# Patient Record
(demographics unavailable — no encounter records)

---

## 2024-11-25 NOTE — ASSESSMENT
[FreeTextEntry1] : reviewed labwork from oct 2024 Extensive discussion on flu vaccine and possible side affects. Pt agreed to get flu vaccine

## 2024-11-25 NOTE — COUNSELING
[Normal Weight - ( BMI  <25 )] : normal weight - ( BMI  <25 ) [Continue diet as tolerated] : continue diet as tolerated based on goals of care [Non - Smoker] : non-smoker [Minimize unnecessary interventions] : minimize unnecessary interventions [Maintain functional ability] : maintain functional ability [Discussed disease trajectory with patient/caregiver] : discussed disease trajectory with patient/caregiver [Likely to achieve goals/desired outcomes] : likely to achieve goals/desired outcomes [Patient/Caregiver has ___ understanding of disease process] : patient/caregiver has [unfilled] understanding of disease process [Advanced Directives discussed: ____] : Advanced directives discussed: [unfilled] [Completed Medical Orders for Life-Sustaining Treatment] : completed medical orders for life-sustaining treatment [DNR] : Code Status: DNR [Limited] : Treatment Guidelines: Limited [Trial of Bipap] : Intubation: Trial of Bipap [[facility] Date: _____] [facility]/last verification date: [unfilled] [_____] : HCP: [unfilled]

## 2024-11-25 NOTE — HISTORY OF PRESENT ILLNESS
[Patient] : patient [FreeTextEntry1] : COPD, CHF.  [FreeTextEntry2] : PMH CHF, HTN, MV replacement with bioprosthetic valve, CAD s/p CABG, aflutter, PAD, COPD on 2L O2 at night and exertion, HLD  Interval events: saw pcp, cardiology, Pulmonology   Vision: good, uses glasses for reading Hearing: hearing difficulties. Will think about following with audiology Gait/Falls/Assisted devices: good, no falls Skin: intact Pain: knees, back - chronic, uses tylenol and icy hot Appetite: alright, no missing teeth. Low sodium diet, no fluid restriction BM: regular Urine: no issues Sleep: always been bad. cant fall asleep and has trouble staying asleep, chronic whole life Mood: good.    Active medical problems: #CHF/HTN/ Mitral valve replacement with bioprosthetic valve / CAD s/p quadruple bypass CABG / Atrial flutter/ PVC / PAD - following w cardiology and HF, entresto 24/26 BID, warfarin (managed by cardiology), eplerenone 25mg daily, rosuvastatin 20mg daily, asa 81mg daily #COPD with emphysema on 2L O2 at night and with exertion/ possible PAWAN - following w pulm, breztri 160/9/4.8 2 puffs BID, albuterol HFA #HLD - statin #Hx HyperK  Social: Lives with Significant other Zayda. no children. Zayda has a daughter and 2 grandsons that patient is very close to as well.  Smoking, alcohol, drugs: Stopped smoking 2019 and smoked since 17yo 1 pack/day. no alcohol, no drugs HCP: primary- Zayda Espana (sig other) [phone], secondary - brother duncan lund  MOLST: DNR/DNI ok with NIMV, hospitalize, use IVF and abx, no FT or dialysis. Limited.   Follows with: PCP - [facility] Heart failure Pulmonology

## 2024-11-25 NOTE — PHYSICAL EXAM
[No Acute Distress] : no acute distress [Normal Sclera/Conjunctiva] : normal sclera/conjunctiva [EOMI] : extra ocular movement intact [Normal Outer Ear/Nose] : the ears and nose were normal in appearance [Normal Oropharynx] : the oropharynx was normal [No Respiratory Distress] : no respiratory distress [No Accessory Muscle Use] : no accessory muscle use [Normal Rate] : heart rate was normal  [Regular Rhythm] : with a regular rhythm [Normal S1, S2] : normal S1 and S2 [No Murmurs] : no murmurs heard [No Edema] : there was no peripheral edema [Normal Bowel Sounds] : normal bowel sounds [Non Tender] : non-tender [Soft] : abdomen soft [Not Distended] : not distended [Normal Post Cervical Nodes] : no posterior cervical lymphadenopathy [Normal Anterior Cervical Nodes] : no anterior cervical lymphadenopathy [No CVA Tenderness] : no ~M costovertebral angle tenderness [No Spinal Tenderness] : no spinal tenderness [Normal Gait] : normal gait [Normal Strength/Tone] : muscle strength and tone were normal [No Rash] : no rash [Oriented x3] : oriented to person, place, and time [Normal Affect] : the affect was normal [Normal Mood] : the mood was normal [Normal Insight/Judgement] : insight and judgment were intact [de-identified] : crackles at L base [Clear to Auscultation] : lungs were clear to auscultation bilaterally [No Motor Deficits] : the motor exam was normal

## 2024-11-25 NOTE — COUNSELING
[Normal Weight - ( BMI  <25 )] : normal weight - ( BMI  <25 ) [Continue diet as tolerated] : continue diet as tolerated based on goals of care [Non - Smoker] : non-smoker [Minimize unnecessary interventions] : minimize unnecessary interventions [Maintain functional ability] : maintain functional ability [Discussed disease trajectory with patient/caregiver] : discussed disease trajectory with patient/caregiver [Likely to achieve goals/desired outcomes] : likely to achieve goals/desired outcomes [Patient/Caregiver has ___ understanding of disease process] : patient/caregiver has [unfilled] understanding of disease process [Advanced Directives discussed: ____] : Advanced directives discussed: [unfilled] [Completed Medical Orders for Life-Sustaining Treatment] : completed medical orders for life-sustaining treatment [DNR] : Code Status: DNR [Limited] : Treatment Guidelines: Limited [Trial of Bipap] : Intubation: Trial of Bipap [[facility] Date: _____] [facility]/last verification date: [unfilled] [_____] : HCP: [unfilled]

## 2024-11-25 NOTE — REASON FOR VISIT
[Follow-Up] : a follow-up visit [Pre-Visit Preparation] : pre-visit preparation was done [FreeTextEntry1] : chf [FreeTextEntry2] : chart review

## 2024-11-25 NOTE — HEALTH RISK ASSESSMENT
[HRA Reviewed] : Health risk assessment reviewed [Independent] : using transportation [Some assistance needed] : managing finances [No falls in past year] : Patient reported no falls in the past year [Yes] : The patient does have visual impairment [TimeGetUpGo] : 18

## 2024-11-25 NOTE — PHYSICAL EXAM
[No Acute Distress] : no acute distress [Normal Sclera/Conjunctiva] : normal sclera/conjunctiva [EOMI] : extra ocular movement intact [Normal Outer Ear/Nose] : the ears and nose were normal in appearance [Normal Oropharynx] : the oropharynx was normal [No Respiratory Distress] : no respiratory distress [No Accessory Muscle Use] : no accessory muscle use [Normal Rate] : heart rate was normal  [Regular Rhythm] : with a regular rhythm [Normal S1, S2] : normal S1 and S2 [No Murmurs] : no murmurs heard [No Edema] : there was no peripheral edema [Normal Bowel Sounds] : normal bowel sounds [Non Tender] : non-tender [Soft] : abdomen soft [Not Distended] : not distended [Normal Post Cervical Nodes] : no posterior cervical lymphadenopathy [Normal Anterior Cervical Nodes] : no anterior cervical lymphadenopathy [No CVA Tenderness] : no ~M costovertebral angle tenderness [No Spinal Tenderness] : no spinal tenderness [Normal Gait] : normal gait [Normal Strength/Tone] : muscle strength and tone were normal [No Rash] : no rash [Oriented x3] : oriented to person, place, and time [Normal Affect] : the affect was normal [Normal Mood] : the mood was normal [Normal Insight/Judgement] : insight and judgment were intact [de-identified] : crackles at L base [Clear to Auscultation] : lungs were clear to auscultation bilaterally [No Motor Deficits] : the motor exam was normal

## 2024-11-25 NOTE — HISTORY OF PRESENT ILLNESS
[Patient] : patient [FreeTextEntry1] : COPD, CHF.  [FreeTextEntry2] : PMH CHF, HTN, MV replacement with bioprosthetic valve, CAD s/p CABG, aflutter, PAD, COPD on 2L O2 at night and exertion, HLD  Interval events: saw pcp, cardiology, Pulmonology   Vision: good, uses glasses for reading Hearing: hearing difficulties. Will think about following with audiology Gait/Falls/Assisted devices: good, no falls Skin: intact Pain: knees, back - chronic, uses tylenol and icy hot Appetite: alright, no missing teeth. Low sodium diet, no fluid restriction BM: regular Urine: no issues Sleep: always been bad. cant fall asleep and has trouble staying asleep, chronic whole life Mood: good.    Active medical problems: #CHF/HTN/ Mitral valve replacement with bioprosthetic valve / CAD s/p quadruple bypass CABG / Atrial flutter/ PVC / PAD - following w cardiology and HF, entresto 24/26 BID, warfarin (managed by cardiology), eplerenone 25mg daily, rosuvastatin 20mg daily, asa 81mg daily #COPD with emphysema on 2L O2 at night and with exertion/ possible PAWAN - following w pulm, breztri 160/9/4.8 2 puffs BID, albuterol HFA #HLD - statin #Hx HyperK  Social: Lives with Significant other Zayda. no children. Zyada has a daughter and 2 grandsons that patient is very close to as well.  Smoking, alcohol, drugs: Stopped smoking 2019 and smoked since 15yo 1 pack/day. no alcohol, no drugs HCP: primary- Zayda Espana (sig other) [phone], secondary - brother duncan lund  MOLST: DNR/DNI ok with NIMV, hospitalize, use IVF and abx, no FT or dialysis. Limited.   Follows with: PCP - [facility] Heart failure Pulmonology

## 2025-01-14 NOTE — ASSESSMENT
[FreeTextEntry1] : Briefly, 69 y/o M w/ h/o active tobacco use (0.5 ppd x 50 years, now down to 2 cig/day) c/b COPD/emphysema (occasional nocturnal oxygen 2LPM), CAD c/b MI (s/p PCI LAD 2010, LCx 2014) s/p CABG (LIMA-LAD, SVG-OM, SVG-Ramus, SVG-OM) and severe MR s/p bioMVR/MACEY appendage closure 4/19, HFrEF/ICM (EF 10-15%, LVEDD 6.8 cm; has refused ICD; DNR), prior ileostomy s/p reversal, aflutter s/p DCCV (on AC) who presents for a follow up for HF management. ACC/AHA stage C and currently endorses NYHA class II symptoms and appears hypervolemic and hypotensive.    1. HFrEF/ICM  - will continue bisoprolol 7.5 mg daily    -continue Entresto 24/26mg BID, pt did not feel well on higher doses  - c/w eplerenone 25 mg daily (had gynecomastia with estefanía previously)  - off  Jardiance, pt did not feel well taking it  - continue Lasix 20mg prn, will give Lasix 20mg daily for the next 3 days and then back to PRN (weight 173 in office but at home 165) -repeat DEANDRA, he prefers to have it done at Dr Serrato's office -reinforced daily weights and monitoring -ongoing smoking cessation  2. PVCs - frequent ectopy; 20% burden - Holter monitor completed as above,  PVC burden approx 10-11% (20 % in past) with 38 vtach runs lasting 16 beats and max rate 190bpm. He is currently on 7.5 mg daily. Pt continues to defer ICD cut is willing got see EP to discuss other options.  Will refer to EP for eval, Cancelled Shultz in past - will reschedule   3. CAD - no angina - Continue on Crestor 20 mg qhs - had stopped Zetia 10 mg daily due to leg cramps (unclear if this is related) - c/w ASA 81 mg daily - didn't tolerate [id number] with Dr. Nevarez  4. Aflutter - currently in sinus  - c/w Coumadin  5. Goals of care - has HCP - reports being a DNR/DNI  RTO with Dr. Jensen in 3 months.
unknown

## 2025-01-14 NOTE — PHYSICAL EXAM
[Well Developed] : well developed [Well Nourished] : well nourished [No Acute Distress] : no acute distress [Normal Conjunctiva] : normal conjunctiva [No Carotid Bruit] : no carotid bruit [Normal S1, S2] : normal S1, S2 [No Rub] : no rub [No Gallop] : no gallop [Clear Lung Fields] : clear lung fields [No Respiratory Distress] : no respiratory distress  [Soft] : abdomen soft [Non Tender] : non-tender [No Masses/organomegaly] : no masses/organomegaly [Normal Bowel Sounds] : normal bowel sounds [No Edema] : no edema [No Cyanosis] : no cyanosis [No Clubbing] : no clubbing [No Varicosities] : no varicosities [No Skin Lesions] : no skin lesions [Moves all extremities] : moves all extremities [No Focal Deficits] : no focal deficits [Normal Speech] : normal speech [Alert and Oriented] : alert and oriented [Normal memory] : normal memory [Murmur] : murmur [Normal] : normal gait [No Rash] : no rash [de-identified] : LAZARO 10-12cm with CHELSYR [de-identified] : l-ll/Vl Systolic murmur at LSB [de-identified] : RRR  [de-identified] : No wheezing

## 2025-01-14 NOTE — CARDIOLOGY SUMMARY
[de-identified] : 1/14/25 Sinus Bradycardia, 57bpm, RBBB, occ PVC's, Lateral infarct, PRWP 9/3/2024  PVCs/ bigeminy 5/24/24 - NSR, PRWP, IVCD 3/13/24 - NSR, trigeminy, NSST 12/22/23 NSR 63, IVCD, NSST 9/22/23 Sinus bradycardia 59, IVCD (  ms) 5/26/23 - sinus with bigeminy 4/7/23 Sinus bradycardia 58,  LAE, IVCD, Q waves V1-V2/1/aVL ( no PVC's)  1/13/23 - HR 64, sinus, PVCs, LAE, IVCD, Q waves V1-V2/1/aVL 10/7/22 NSR, HR 75, PVCs, first deg AV block, IVCD, Q waves V1-V2/I/aVL 7/13/22 NSR 73 , Q waves V1-V2, PRWP with PVC's 4/8/22 - sinus, Q waves; no PVC noted 12/8/21 NSR 65, Q waves V1-V2, PRWP, PVCS 8/6/21 - NSR, Q waves V1-V2, PRWP, PVCs 6/2/21 NSR 65, LAD, Q waves V1-V2, PRWP, NSST with PVC's 4/2/21 - NSR, bigeminy, left axis; Q waves V1-V2, I, avL; PRWP (unchanged)  [de-identified] : 6/29/22 - EF 25% (best motion in basal lateral wall), LVIDD 7.2 cm, Bio MVR (mean 4), mild AR, mod diastolic dysfunction stage II, normal RV systolic function, no PFO, E/e' 24, LVOT VTI 28 cm, IVC 1.3 cm  4/23/21 - EF 10-15% (segmental; septal AK, anterolateral, apical and inferoseptal akinesis), LVEDD 7.1 cm, nl RV size/function, bioMVR , E/e' 21, IVC small   7/23/19 - LVEDD 5.9 cm, EF 25% (segmental; septal akinesis and apical akinesis); frequent PVCs, bioMVR  [de-identified] : 4/18/19 (pre-CABG) - distal LM 70%, ostial LAD 70%; ostial LCx 99%, distal LCx 80%; mRCA 50%  [de-identified] : \par  6/2022 PFTs : severe obstructive pattern\par  FEV1 1.19 L (35%), FVC 2.08 L (45%). Ratio 57%. \par  RV 4.58 (193%)- hyperinflated\par  DLCO 41%\par  Severe obstructive defect with severely reduced DLCO. \par  \par  PFTs 5/21/2018: FEV1 1.7 L (48%) \par  FVC 3.01 (64%) \par  RV 4.33 L\par  DLCO 47%

## 2025-01-14 NOTE — HISTORY OF PRESENT ILLNESS
[FreeTextEntry1] : Briefly, 67 y/o M w/ h/o active tobacco use (0.5 ppd x 50 years, now down to 2 cig/day) c/b COPD/emphysema (occasional nocturnal oxygen 2LPM), CAD c/b MI (s/p PCI LAD 2010, LCx 2014) s/p CABG (LIMA-LAD, SVG-OM, SVG-Ramus, SVG-OM) and severe MR s/p bioMVR/MACEY appendage closure 4/19, HFrEF/ICM (EF 10-15%, LVEDD 6.8 cm; has refused ICD; DNR), prior ileostomy s/p reversal, aflutter s/p DCCV (on AC) who presents for a follow up for HF management. Followed by Dr. Clinton Serrato (primary cardiologist).    Patient comes in today on 1/14/25 for follow up.  No hospitalization or ER visits since last visit on 9/3/24. Currently feels a bit SOB occ at rest and minimal exertion.  When not feeling well he can only do1 flight of stairs that causes SOB.  However, most days feels good.  Usually taking an inhaler puff or Prednisone helps.  He has not needed extra Lasix.  Denies orthopnea or OND.  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  -Weight: at home 165 lbs, in office today 173lbs. Has is not on daily Lasix and not taken any PRN diuretics. -BP: at home no taking, in office today 103/63 -HR: 59, 57 on EKG  -Denies: CP, palpitations, LH/dizziness, orthopnea, PND, bendopnea, abdominal distension, LE edema.   EKG 1/14/25 Sinus Bradycardia, 57bpm, RBBB, occ PVC's, Lateral infarct, PRWP Holter 9/2024: F/u with pt re. loop recorder results- PVC burden approx 10-11% (20 % in past) with 38 vtach runs lasting 16 beats and max rate 190bpm. He is currently on 7.5 mg daily. Pt continues to defer ICD cut is willing got see EP to discuss other options.  Will refer to EP for eval, Cancelled Shultz in past - will reschedule   Previous plan/consider: - repeat DEANDRA- he will have it done at Dr Serrato's office Last labs 10/2024, will repeat today

## 2025-01-24 NOTE — HISTORY OF PRESENT ILLNESS
[FreeTextEntry1] : Felipe Cuellar is a 67y/o man with Hx of active tobacco use (0.5 ppd x 50 years, now down to 2 cig/day) c/b COPD/emphysema (occasional nocturnal oxygen 2LPM), CAD c/b MI (s/p PCI LAD 2010, LCx 2014) s/p CABG (LIMA-LAD, SVG-OM, SVG-Ramus, SVG-OM) and severe MR s/p bioMVR/MACEY appendage closure 4/19, HFrEF/ICM (EF 10-15%, LVEDD 6.8 cm; has refused ICD; DNR), prior ileostomy s/p reversal, aflutter s/p DCCV (on AC) and frequent PVCs who presents today for initial evaluation. Has been following with HF team for years and has been referred for an ICD several times but has always chosen to avoid. Struggles with dyspnea, some days worse then others. Denies chest pain, syncope or near syncope. Saw HF team the other week and was open to rediscussing the possibility of an ICD. Also has frequent monomorphic PVCs which he may be experiencing worsening dyspnea on days when they are more present. His hast Holter 9/2024 with noted PVCs, 6% burden, although also had a 5% APC burden which may have actually been PVCs (overall 11% ectopy burden) and up to 11 sec of NSVT (38 runs in total). Does take extra bisoprolol at times which can make him feel a little better.

## 2025-01-24 NOTE — DISCUSSION/SUMMARY
[FreeTextEntry1] : Impression:  1. Chronic systolic CHF: NYHA Class II symptoms. EKG performed today to assess for presence of conduction disease and reveals sinus rhythm with frequent monomorphic PVCs, bigeminy pattern. Patient also had multiple episodes of NSVT by monitor.   msec. Given severe LV dysfunction, NYHA Class II symptoms and QRS > 150msec, recommended BiV ICD implant, considered subpectoral placement but may be safe to implant pre-pectoral, for primary prevention of SCD.   2. PVCs: frequent monomorphic PVCs and NSVT. Can consider ablation (after ICD pursued). In meantime, can trial mexiletine for PVC suppression. Would avoid amiodarone given HX of use and felt unwell as well as pulmonary hx.  [EKG obtained to assist in diagnosis and management of assessed problem(s)] : EKG obtained to assist in diagnosis and management of assessed problem(s)

## 2025-01-24 NOTE — HISTORY OF PRESENT ILLNESS
[FreeTextEntry1] : Felipe Cuellar is a 69y/o man with Hx of active tobacco use (0.5 ppd x 50 years, now down to 2 cig/day) c/b COPD/emphysema (occasional nocturnal oxygen 2LPM), CAD c/b MI (s/p PCI LAD 2010, LCx 2014) s/p CABG (LIMA-LAD, SVG-OM, SVG-Ramus, SVG-OM) and severe MR s/p bioMVR/MACEY appendage closure 4/19, HFrEF/ICM (EF 10-15%, LVEDD 6.8 cm; has refused ICD; DNR), prior ileostomy s/p reversal, aflutter s/p DCCV (on AC) and frequent PVCs who presents today for initial evaluation. Has been following with HF team for years and has been referred for an ICD several times but has always chosen to avoid. Struggles with dyspnea, some days worse then others. Denies chest pain, syncope or near syncope. Saw HF team the other week and was open to rediscussing the possibility of an ICD. Also has frequent monomorphic PVCs which he may be experiencing worsening dyspnea on days when they are more present. His hast Holter 9/2024 with noted PVCs, 6% burden, although also had a 5% APC burden which may have actually been PVCs (overall 11% ectopy burden) and up to 11 sec of NSVT (38 runs in total). Does take extra bisoprolol at times which can make him feel a little better.

## 2025-01-24 NOTE — CARDIOLOGY SUMMARY
[de-identified] : 6/29/22 - EF 25% (best motion in basal lateral wall), LVIDD 7.2 cm, Bio MVR (mean 4), mild AR, mod diastolic dysfunction stage II, normal RV systolic function, no PFO, E/e' 24, LVOT VTI 28 cm, IVC 1.3 cm  4/23/21 - EF 10-15% (segmental; septal AK, anterolateral, apical and inferoseptal akinesis), LVEDD 7.1 cm, nl RV size/function, bioMVR , E/e' 21, IVC small  7/23/19 - LVEDD 5.9 cm, EF 25% (segmental; septal akinesis and apical akinesis); frequent PVCs, bioMVR   [de-identified] : 4/18/19 (pre-CABG) - distal LM 70%, ostial LAD 70%; ostial LCx 99%, distal LCx 80%; mRCA 50%

## 2025-01-24 NOTE — REASON FOR VISIT
[Cardiac Failure] : cardiac failure [Arrhythmia/ECG Abnorrmalities] : arrhythmia/ECG abnormalities [FreeTextEntry3] : Anthony Jensen MD

## 2025-01-24 NOTE — CARDIOLOGY SUMMARY
[de-identified] : 6/29/22 - EF 25% (best motion in basal lateral wall), LVIDD 7.2 cm, Bio MVR (mean 4), mild AR, mod diastolic dysfunction stage II, normal RV systolic function, no PFO, E/e' 24, LVOT VTI 28 cm, IVC 1.3 cm  4/23/21 - EF 10-15% (segmental; septal AK, anterolateral, apical and inferoseptal akinesis), LVEDD 7.1 cm, nl RV size/function, bioMVR , E/e' 21, IVC small  7/23/19 - LVEDD 5.9 cm, EF 25% (segmental; septal akinesis and apical akinesis); frequent PVCs, bioMVR   [de-identified] : 4/18/19 (pre-CABG) - distal LM 70%, ostial LAD 70%; ostial LCx 99%, distal LCx 80%; mRCA 50%

## 2025-03-11 NOTE — PHYSICAL EXAM
[No Acute Distress] : no acute distress [Normal Sclera/Conjunctiva] : normal sclera/conjunctiva [EOMI] : extra ocular movement intact [Normal Outer Ear/Nose] : the ears and nose were normal in appearance [Normal Oropharynx] : the oropharynx was normal [No Respiratory Distress] : no respiratory distress [Clear to Auscultation] : lungs were clear to auscultation bilaterally [No Accessory Muscle Use] : no accessory muscle use [Normal Rate] : heart rate was normal  [Regular Rhythm] : with a regular rhythm [Normal S1, S2] : normal S1 and S2 [No Murmurs] : no murmurs heard [No Edema] : there was no peripheral edema [Normal Bowel Sounds] : normal bowel sounds [Non Tender] : non-tender [Soft] : abdomen soft [Not Distended] : not distended [Normal Post Cervical Nodes] : no posterior cervical lymphadenopathy [Normal Anterior Cervical Nodes] : no anterior cervical lymphadenopathy [No CVA Tenderness] : no ~M costovertebral angle tenderness [No Spinal Tenderness] : no spinal tenderness [Normal Gait] : normal gait [Normal Strength/Tone] : muscle strength and tone were normal [No Rash] : no rash [No Motor Deficits] : the motor exam was normal [Oriented x3] : oriented to person, place, and time [Normal Affect] : the affect was normal [Normal Mood] : the mood was normal [Normal Insight/Judgement] : insight and judgment were intact

## 2025-03-11 NOTE — HISTORY OF PRESENT ILLNESS
[Patient] : patient [FreeTextEntry1] : COPD, CHF.  [FreeTextEntry2] : PMH CHF, HTN, MV replacement with bioprosthetic valve, CAD s/p CABG, aflutter, PAD, COPD on 2L O2 at night and exertion, HLD  Interval events: saw cardiology- was recommended for AICD / PPM- he is considering   smoking 3-4 cigarettes daily still working part time a few days a week,   Vision: good, uses glasses for reading Hearing: hearing difficulties. Will think about following with audiology Gait/Falls/Assisted devices: good, no falls Skin: intact Pain: knees, back - chronic, uses tylenol and icy hot Appetite: alright, no missing teeth. Low sodium diet, no fluid restriction BM: regular Urine: no issues Sleep: always been bad. cant fall asleep and has trouble staying asleep, chronic whole life Mood: good.    Active medical problems: #CHF/HTN/ Mitral valve replacement with bioprosthetic valve / CAD s/p quadruple bypass CABG / Atrial flutter/ PVC / PAD - following w cardiology and HF, entresto 24/26 BID, warfarin (managed by cardiology), eplerenone 25mg daily, rosuvastatin 20mg daily, asa 81mg daily #COPD with emphysema on 2L O2 at night and with exertion/ possible PAWAN - following w pulm, breztri 160/9/4.8 2 puffs BID, albuterol HFA #HLD - statin #Hx HyperK  Social: Lives with Significant other Zayda. no children. Zayda has a daughter and 2 grandsons that patient is very close to as well.  Smoking, alcohol, drugs: Stopped smoking 2019 and smoked since 15yo 1 pack/day. no alcohol, no drugs HCP: primary- Zayda Espana (sig other) [phone], secondary - brother duncan lund  MOLST: DNR/DNI ok with NIMV, hospitalize, use IVF and abx, no FT or dialysis. Limited.   Follows with: PCP - [facility] Heart failure Pulmonology

## 2025-03-11 NOTE — COUNSELING
[Normal Weight - ( BMI  <25 )] : normal weight - ( BMI  <25 ) [Continue diet as tolerated] : continue diet as tolerated based on goals of care [Non - Smoker] : non-smoker [Minimize unnecessary interventions] : minimize unnecessary interventions [Maintain functional ability] : maintain functional ability [Discussed disease trajectory with patient/caregiver] : discussed disease trajectory with patient/caregiver [Likely to achieve goals/desired outcomes] : likely to achieve goals/desired outcomes [Patient/Caregiver has ___ understanding of disease process] : patient/caregiver has [unfilled] understanding of disease process [Advanced Directives discussed: ____] : Advanced directives discussed: [unfilled] [Completed Medical Orders for Life-Sustaining Treatment] : completed medical orders for life-sustaining treatment [DNR] : Code Status: DNR [Limited] : Treatment Guidelines: Limited [Trial of Bipap] : Intubation: Trial of Bipap [[facility]: _____] [facility]last verification date: [unfilled] [_____] : HCP: [unfilled]

## 2025-03-11 NOTE — ASSESSMENT
[FreeTextEntry1] : -labs from Jan 2025 reviewed. -reviewed benefits of AICD/PPM, cautioned on dangers of driving given frequency of arrythmias

## 2025-05-02 NOTE — HISTORY OF PRESENT ILLNESS
[FreeTextEntry1] : Mr. Cuellar is a 69 y/o M w/ h/o active tobacco use (0.5 ppd x 50 years, now down to 2 cig/day) c/b COPD/emphysema (occasional nocturnal oxygen 2LPM), CAD c/b MI (s/p PCI LAD 2010, LCx 2014) s/p CABG (LIMA-LAD, SVG-OM, SVG-Ramus, SVG-OM) and severe MR s/p bioMVR/MACEY appendage closure 4/19, HFrEF/ICM (EF 10-15%, LVEDD 6.8 cm; has refused ICD; DNR), prior ileostomy s/p reversal, aflutter s/p DCCV (on AC) who presents for a follow up for HF management. Followed by Dr. Clinton Serrato (primary cardiologist).   No hospitalization or ER visits since last visit on 9/3/24.  When not feeling well he can only do1 flight of stairs that causes SOB.  However, most days feels good. Usually taking an inhaler puff or Prednisone helps. He has not needed extra Lasix. Denies orthopnea or OND.  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  -Weight: at home 172 lbs. Has is not on daily Lasix and not taken any PRN diuretics. -BP: at home no taking, in office today 103/63 -Denies: CP, palpitations, LH/dizziness, orthopnea, PND, bendopnea, abdominal distension, LE edema.  Pt continues to defer ICD. Was seen by Dr. Davis and was recommended to have BiV device given QRS >150 msec but patient still reluctant.

## 2025-05-02 NOTE — PHYSICAL EXAM
[Well Developed] : well developed [Well Nourished] : well nourished [No Acute Distress] : no acute distress [Normal Conjunctiva] : normal conjunctiva [No Carotid Bruit] : no carotid bruit [Normal S1, S2] : normal S1, S2 [No Rub] : no rub [No Gallop] : no gallop [Murmur] : murmur [Clear Lung Fields] : clear lung fields [No Respiratory Distress] : no respiratory distress  [Soft] : abdomen soft [Non Tender] : non-tender [No Masses/organomegaly] : no masses/organomegaly [Normal Bowel Sounds] : normal bowel sounds [Normal] : normal gait [No Edema] : no edema [No Cyanosis] : no cyanosis [No Clubbing] : no clubbing [No Varicosities] : no varicosities [No Rash] : no rash [No Skin Lesions] : no skin lesions [Moves all extremities] : moves all extremities [No Focal Deficits] : no focal deficits [Normal Speech] : normal speech [Alert and Oriented] : alert and oriented [Normal memory] : normal memory [de-identified] : JVP 8-10 cm with HJR [de-identified] : l-ll/Vl Systolic murmur at LSB [de-identified] : RRR  [de-identified] : No wheezing

## 2025-05-02 NOTE — ASSESSMENT
[FreeTextEntry1] : Mr. Cuellar is a 69 y/o M w/ h/o active tobacco use (0.5 ppd x 50 years, now down to 2 cig/day) c/b COPD/emphysema (occasional nocturnal oxygen 2LPM), CAD c/b MI (s/p PCI LAD 2010, LCx 2014) s/p CABG (LIMA-LAD, SVG-OM, SVG-Ramus, SVG-OM) and severe MR s/p bioMVR/MACEY appendage closure 4/19, HFrEF/ICM (EF 10-15%, LVEDD 6.8 cm; has refused ICD; DNR), prior ileostomy s/p reversal, aflutter s/p DCCV (on AC) who presents for a follow up for HF management. Currently ACC/AHA stage C and currently endorses NYHA class II symptoms and appears euvolemic and normotensive.  1. HFrEF/ICM - will continue bisoprolol 7.5 mg daily    -continue Entresto 24/26mg BID, pt did not feel well on higher doses  - c/w eplerenone 25 mg daily (had gynecomastia with estefanía previously)  - off Jardiance, pt did not feel well taking it  - continue Lasix 20mg prn; goal 170-172 pounds - repeat TTE; hasn't had one done since 2022 - reinforced daily weights and monitoring - ongoing smoking cessation  2. PVCs - frequent ectopy; 20% burden - Holter monitor completed; PVC burden approx 10-11% (20 % in past) with 38 vtach runs lasting 16 beats and max rate 190bpm. He is currently on 7.5 mg daily. Pt continues to defer ICD cut is willing got see EP to discuss other options.   3. CAD - no angina - Continue on Crestor 20 mg qhs - had stopped Zetia 10 mg daily due to leg cramps (unclear if this is related) - c/w ASA 81 mg daily - didn't tolerate PCSK9i with Dr. Nevarez - repeat labs in office  4. Aflutter - currently in sinus  - c/w Coumadin  5. Goals of care - has HCP - reports being a DNR/DNI  RTC PA in 2 and 4 months

## 2025-05-02 NOTE — CARDIOLOGY SUMMARY
[de-identified] : 5/2/25 sinus, PVCs, RBBB  1/14/25 Sinus Bradycardia, 57bpm, RBBB, occ PVC's, Lateral infarct, PRWP 9/3/2024  PVCs/ bigeminy 5/24/24 - NSR, PRWP, IVCD 3/13/24 - NSR, trigeminy, NSST 12/22/23 NSR 63, IVCD, NSST 9/22/23 Sinus bradycardia 59, IVCD (  ms) 5/26/23 - sinus with bigeminy 4/7/23 Sinus bradycardia 58,  LAE, IVCD, Q waves V1-V2/1/aVL ( no PVC's)  1/13/23 - HR 64, sinus, PVCs, LAE, IVCD, Q waves V1-V2/1/aVL 10/7/22 NSR, HR 75, PVCs, first deg AV block, IVCD, Q waves V1-V2/I/aVL 7/13/22 NSR 73 , Q waves V1-V2, PRWP with PVC's 4/8/22 - sinus, Q waves; no PVC noted 12/8/21 NSR 65, Q waves V1-V2, PRWP, PVCS 8/6/21 - NSR, Q waves V1-V2, PRWP, PVCs 6/2/21 NSR 65, LAD, Q waves V1-V2, PRWP, NSST with PVC's 4/2/21 - NSR, bigeminy, left axis; Q waves V1-V2, I, avL; PRWP (unchanged)  [de-identified] : Holter 9/2024: PVC burden approx 10-11% (20 % in past) with 38 vtach runs lasting 16 beats and max rate 190bpm.  [de-identified] : 6/29/22 - EF 25% (best motion in basal lateral wall), LVIDD 7.2 cm, Bio MVR (mean 4), mild AR, mod diastolic dysfunction stage II, normal RV systolic function, no PFO, E/e' 24, LVOT VTI 28 cm, IVC 1.3 cm  4/23/21 - EF 10-15% (segmental; septal AK, anterolateral, apical and inferoseptal akinesis), LVEDD 7.1 cm, nl RV size/function, bioMVR , E/e' 21, IVC small   7/23/19 - LVEDD 5.9 cm, EF 25% (segmental; septal akinesis and apical akinesis); frequent PVCs, bioMVR  [de-identified] : 4/18/19 (pre-CABG) - distal LM 70%, ostial LAD 70%; ostial LCx 99%, distal LCx 80%; mRCA 50%  [de-identified] : \par  6/2022 PFTs : severe obstructive pattern\par  FEV1 1.19 L (35%), FVC 2.08 L (45%). Ratio 57%. \par  RV 4.58 (193%)- hyperinflated\par  DLCO 41%\par  Severe obstructive defect with severely reduced DLCO. \par  \par  PFTs 5/21/2018: FEV1 1.7 L (48%) \par  FVC 3.01 (64%) \par  RV 4.33 L\par  DLCO 47%

## 2025-07-01 NOTE — COUNSELING
[Normal Weight - ( BMI  <25 )] : normal weight - ( BMI  <25 ) [Continue diet as tolerated] : continue diet as tolerated based on goals of care [Smoker, not interested in quitting] : smoker, not interested in quitting [Minimize unnecessary interventions] : minimize unnecessary interventions [Maintain functional ability] : maintain functional ability [Discussed disease trajectory with patient/caregiver] : discussed disease trajectory with patient/caregiver [Likely to achieve goals/desired outcomes] : likely to achieve goals/desired outcomes [Patient/Caregiver has ___ understanding of disease process] : patient/caregiver has [unfilled] understanding of disease process [Advanced Directives discussed: ____] : Advanced directives discussed: [unfilled] [Completed Medical Orders for Life-Sustaining Treatment] : completed medical orders for life-sustaining treatment [DNR] : Code Status: DNR [Limited] : Treatment Guidelines: Limited [Trial of Bipap] : Intubation: Trial of Bipap [[facility]: _____[facility]/last verification date: [unfilled] [_____] : HCP: [unfilled]

## 2025-07-01 NOTE — COUNSELING
[Normal Weight - ( BMI  <25 )] : normal weight - ( BMI  <25 ) [Continue diet as tolerated] : continue diet as tolerated based on goals of care [Smoker, not interested in quitting] : smoker, not interested in quitting [Minimize unnecessary interventions] : minimize unnecessary interventions [Maintain functional ability] : maintain functional ability [Discussed disease trajectory with patient/caregiver] : discussed disease trajectory with patient/caregiver [Likely to achieve goals/desired outcomes] : likely to achieve goals/desired outcomes [Patient/Caregiver has ___ understanding of disease process] : patient/caregiver has [unfilled] understanding of disease process [Advanced Directives discussed: ____] : Advanced directives discussed: [unfilled] [Completed Medical Orders for Life-Sustaining Treatment] : completed medical orders for life-sustaining treatment [DNR] : Code Status: DNR [Limited] : Treatment Guidelines: Limited [Trial of Bipap] : Intubation: Trial of Bipap [Last [facility] Date: _____] [facility] Completion/last verification date: [unfilled] [_____] : HCP: [unfilled]

## 2025-07-07 NOTE — ASSESSMENT
[FreeTextEntry1] : - start lexapro, risks and side effects reviewed -labwork reviewed, renal panel in 2 week - post SSRI start -reviewed benefits of AICD/PPM, cautioned on dangers of driving given frequency of arrythmias

## 2025-07-07 NOTE — HISTORY OF PRESENT ILLNESS
[Patient] : patient [FreeTextEntry1] : COPD, CHF.  [FreeTextEntry2] : PMH CHF, HTN, MV replacement with bioprosthetic valve, CAD s/p CABG, aflutter, PAD, COPD on 2L O2 at night and exertion, HLD  Interval events: saw cardiology- recommended jardiance but didnt tolerate. Saw EP - recommended for AICD / PPM- he is considering  smoking 3-4 cigarettes daily still working part time a few days a week,   Vision: good, uses glasses for reading Hearing: hearing difficulties. Will think about following with audiology Gait/Falls/Assisted devices: good, no falls Skin: intact Pain: knees, back - chronic, uses tylenol and icy hot Appetite: alright, no missing teeth. Low sodium diet, no fluid restriction BM: regular Urine: no issues Sleep: always been bad. cant fall asleep and has trouble staying asleep, chronic whole life Mood: good.    Active medical problems: #CHF/HTN/ Mitral valve replacement with bioprosthetic valve / CAD s/p quadruple bypass CABG / Atrial flutter/ PVC / PAD - following w cardiology and HF, entresto 24/26 BID, warfarin (managed by cardiology), eplerenone 25mg daily, rosuvastatin 20mg daily, asa 81mg daily #COPD with emphysema on 2L O2 at night and with exertion/ possible PAWAN - following w pulm, breztri 160/9/4.8 2 puffs BID, albuterol HFA #HLD - statin #Hx HyperK  Social: Lives with Significant other Zayda. no children. Zayda has a daughter and 2 grandsons that patient is very close to as well.  Smoking, alcohol, drugs: Stopped smoking 2019 and smoked since 17yo 1 pack/day. no alcohol, no drugs HCP: primary- Zayda Espana (sig other) [phone], secondary - brother duncan lund  MOLST: DNR/DNI ok with NIMV, hospitalize, use IVF and abx, no FT or dialysis. Limited.   Follows with: PCP - [facility] Heart failure Pulmonology

## 2025-07-07 NOTE — HISTORY OF PRESENT ILLNESS
[Patient] : patient [FreeTextEntry1] : COPD, CHF.  [FreeTextEntry2] : PMH CHF, HTN, MV replacement with bioprosthetic valve, CAD s/p CABG, aflutter, PAD, COPD on 2L O2 at night and exertion, HLD  Interval events: saw cardiology- recommended jardiance but didnt tolerate. Saw EP - recommended for AICD / PPM- he is considering  smoking 3-4 cigarettes daily still working part time a few days a week,   Vision: good, uses glasses for reading Hearing: hearing difficulties. Will think about following with audiology Gait/Falls/Assisted devices: good, no falls Skin: intact Pain: knees, back - chronic, uses tylenol and icy hot Appetite: alright, no missing teeth. Low sodium diet, no fluid restriction BM: regular Urine: no issues Sleep: always been bad. cant fall asleep and has trouble staying asleep, chronic whole life Mood: good.    Active medical problems: #CHF/HTN/ Mitral valve replacement with bioprosthetic valve / CAD s/p quadruple bypass CABG / Atrial flutter/ PVC / PAD - following w cardiology and HF, entresto 24/26 BID, warfarin (managed by cardiology), eplerenone 25mg daily, rosuvastatin 20mg daily, asa 81mg daily #COPD with emphysema on 2L O2 at night and with exertion/ possible PAWAN - following w pulm, breztri 160/9/4.8 2 puffs BID, albuterol HFA #HLD - statin #Hx HyperK  Social: Lives with Significant other Zayda. no children. Zayda has a daughter and 2 grandsons that patient is very close to as well.  Smoking, alcohol, drugs: Stopped smoking 2019 and smoked since 17yo 1 pack/day. no alcohol, no drugs HCP: primary- Zayda Espana (sig other) [phone], secondary - brother duncan lund  MOLST: DNR/DNI ok with NIMV, hospitalize, use IVF and abx, no FT or dialysis. Limited.   Follows with: PCP - [facility] Heart failure Pulmonology

## 2025-07-18 NOTE — PHYSICAL EXAM
[No Murmur] : no murmur [Normal] : moves all extremities, no focal deficits, normal speech [de-identified] : Dyspneic at rest longtime smoker COPD configuration [de-identified] : Irregular irregular [de-identified] : Decreased breath sounds throughout [de-identified] : Appropriately anxious

## 2025-07-18 NOTE — REASON FOR VISIT
[Arrhythmia/ECG Abnorrmalities] : arrhythmia/ECG abnormalities [Spouse] : spouse [FreeTextEntry3] : Dr. Salomon [FreeTextEntry1] : Bisi has been seen by multiple cardiac specialists including heart failure electrophysiology and general cardiology Dr. Clinton Serrato. bisi Cuellar is a 67y/o man with Hx of active tobacco use (0.5 ppd x 50 years, now down to 2 cig/day) c/b COPD/emphysema (occasional nocturnal oxygen 2LPM), CAD c/b MI (s/p PCI LAD 2010, LCx 2014) s/p CABG (LIMA-LAD, SVG-OM, SVG-Ramus, SVG-OM) and severe MR s/p bioMVR/MACEY appendage closure 4/19, HFrEF/ICM (EF 10-15%, LVEDD 6.8 cm; has refused ICD; DNR), prior ileostomy s/p reversal, aflutter s/p DCCV (on AC) and frequent PVCs who presents today for initial evaluation. Has been following with HF team for years and has been referred for an ICD several times but has always chosen to avoid. Struggles with dyspnea, some days worse than others. Denies chest pain, syncope or near syncope. Saw HF team the other week and was open to rediscussing the possibility of an ICD.  Bisi was seen at [facility] urgent care and the chest x-ray was notable for COPD exacerbation clear lungs the cardiac silhouette was enlarged he is referred urgently for dyspnea. He is now noted to be in atrial fibrillation. he had previously seen Dr. Davis with plans for possible ICD and he was reluctant as he is today, he has severe left ventricular dysfunction with saturation in the 90 to 93% range atrial fibrillation is new he is a longtime smoker since age 16 at least 1 pack/day now smokes 4 cigarettes/day.  5/2/2025 INR 2.05

## 2025-07-18 NOTE — ASSESSMENT
[FreeTextEntry1] : I long discussion with the patient and his wife and would recommend continued anticoagulation with a target INR 2-3. a brain natruretic peptide is requested in order to sort out pulmonary and cardiac causes of dyspnea.  Although I suspect the new onset of dyspnea reflects new onset A-fib complicated by longstanding COPD and emphysema. he should see Dr. Davis in follow-up with plans for possible cardioversion and even ablation if deemed a candidate. although with left ventricular dysfunction I suspect that he would not hold an ablation procedure. rate controlled atrial fibrillation.  He seems against procedures  A 2-week event recorder is applied today in order to assess atrial fibrillation rate response  I discussed with wife at bedside

## 2025-07-23 NOTE — DISCUSSION/SUMMARY
[Coronary Artery Disease] : coronary artery disease [Prior Myocardial Infarction] : prior myocardial infarction [PVCs] : ectopic ventricular beats [Holter Monitor] : a Holter monitor [None] : There are no changes in medication management [Medication Changes Per Orders] : Medication changes are as documented in orders [Electrical Cardioversion] : electrical cardioversion [Catheter Ablation Arrhythmogenic Foci] : catheter ablation of the arrhythmogenic foci [Cardiomyopathy] : cardiomyopathy [ICD] : an implantable cardioverter-defibrillator [Family] : the patient's family [Adenosine Stress Test] : adenosine stress test [Stable] : stable [Coronary Artery Catheterization] : coronary artery catheterization [Hyperlipidemia] : hyperlipidemia [Lipids Test Panel] : a fasting lipid profile [Known CAD] : known coronary artery disease [Hypertension] : hypertension [Smoking] : smoking [Diet Modification] : diet modification [Responding to Treatment] : responding to treatment [COPD] : chronic obstructive pulmonary disease [Not Responding to Treatment] : not responding to treatment [Echocardiogram] : an echocardiogram [Pulmonary Function Tests] : pulmonary function tests [Stress Test Pharmacologic] : a pharmacologic stress test [Smoking Cessation] : smoking cessation [Patient] : the patient [Minutes Spent: ___] : for [unfilled] ~Uminutes [de-identified] : s/p cabg, vent bigeminy [de-identified] : new afib [de-identified] : f/u dr albarran for possible Cardioversion-eps eval [de-identified] : f/u Dr Jensen, CHF team [de-identified] : carotid dopplers [de-identified] : smoker [de-identified] : see HF team, pulm f/u, still smokes, needs O2 [FreeTextEntry2] : last seen 2 year ago,reviewed prior notes, tests [FreeTextEntry1] : \par

## 2025-07-23 NOTE — HISTORY OF PRESENT ILLNESS
[FreeTextEntry1] : Mr. Cuellar is a 63 year old man with an ischemic cardiomyopathy due to coronary artery disease. He had a myocardial infarction in 2010 and underwent PCI to the LAD. He subsequently underwent PCI to the left circumflex in 2014. He recently underwent 4-vessel CABG with bioprosthetic mitral valve replacement and left atrial appendage exclusion on April 19th. His LVEF improved from 10-15% to 32% following the surgery. On April 29th he underwent DCCV for atrial flutter. He was discharged home on 5/1 with a LifeVest, but he is not currently wearing it and has not been interested in pursuing an implantable defibrillator.\par  \par  His other comorbidities include a history of anxiety and history of an emergent appendectomy and diverting ileostomy for SBP s/p reversal.\par  \par  Since his last HF clinic visit, he continues to do well. He is working intermittently as an  and says that throughout the day he walks up an down multiple flights of stairs. He does get short of breath with climbing stairs, but does not have to stop. He has a persistent cough, which appears to follow taking the Entresto. He notes no swelling in his legs. He denies palpitations, chest pain, PND or orthopnea. He notes ongoing bilateral nipple discomfort, which has persisted despite change to eplerenone from spironolactone. He notes no nausea or vomiting.  He continues to suffer from anxiety. He continues to smoke cigarettes, about 2 daily.

## 2025-07-23 NOTE — REASON FOR VISIT
[Follow-Up - Clinic] : a clinic follow-up of [Cardiomyopathy] : cardiomyopathy [Coronary Artery Disease] : coronary artery disease [Heart Failure] : congestive heart failure [FreeTextEntry2] : Pt refused icd.occasional dyspnea, still smokes, sscp resolved s/p cabg, pt f/u with HF team, no new sx, last seen 2 years ago, pt s/p hosp admit 2/2023 for PNA/copd, BB changed to bisoprolol per HF, still gets dyspnea, better if takes prednisone, still smokes, now presents after last seen 2 years ago with 3-4 weeks increase dyspnea and found to be in new afib by Dr Zhong 7/17/25, does say he gets some chest tightness on exertion with sob [Spouse] : spouse

## 2025-07-23 NOTE — PHYSICAL EXAM
[General Appearance - Well Developed] : well developed [Normal Appearance] : normal appearance [Well Groomed] : well groomed [General Appearance - Well Nourished] : well nourished [No Deformities] : no deformities [General Appearance - In No Acute Distress] : no acute distress [Normal Conjunctiva] : the conjunctiva exhibited no abnormalities [Eyelids - No Xanthelasma] : the eyelids demonstrated no xanthelasmas [Normal Oral Mucosa] : normal oral mucosa [No Oral Pallor] : no oral pallor [No Oral Cyanosis] : no oral cyanosis [Normal Jugular Venous A Waves Present] : normal jugular venous A waves present [Normal Jugular Venous V Waves Present] : normal jugular venous V waves present [No Jugular Venous Linda A Waves] : no jugular venous linda A waves [Respiration, Rhythm And Depth] : normal respiratory rhythm and effort [Exaggerated Use Of Accessory Muscles For Inspiration] : no accessory muscle use [Auscultation Breath Sounds / Voice Sounds] : lungs were clear to auscultation bilaterally [Heart Sounds] : normal S1 and S2 [Edema] : no peripheral edema present [Bowel Sounds] : normal bowel sounds [Abdomen Soft] : soft [Abdomen Tenderness] : non-tender [Abdomen Mass (___ Cm)] : no abdominal mass palpated [Abnormal Walk] : normal gait [Gait - Sufficient For Exercise Testing] : the gait was sufficient for exercise testing [Nail Clubbing] : no clubbing of the fingernails [Cyanosis, Localized] : no localized cyanosis [Petechial Hemorrhages (___cm)] : no petechial hemorrhages [Skin Color & Pigmentation] : normal skin color and pigmentation [Skin Turgor] : normal skin turgor [] : no rash [No Venous Stasis] : no venous stasis [Skin Lesions] : no skin lesions [No Skin Ulcers] : no skin ulcer [No Xanthoma] : no  xanthoma was observed [Oriented To Time, Place, And Person] : oriented to person, place, and time [Impaired Insight] : insight and judgment were intact [Affect] : the affect was normal [Mood] : the mood was normal [No Anxiety] : not feeling anxious [FreeTextEntry1] : Anxious [Normal Rate] : normal [Normal S1] : normal S1 [Normal S2] : normal S2 [S3] : no S3 [S4] : no S4 [No Murmur] : no murmurs heard [Right Carotid Bruit] : no bruit heard over the right carotid [Left Carotid Bruit] : no bruit heard over the left carotid [Right Femoral Bruit] : no bruit heard over the right femoral artery [Left Femoral Bruit] : no bruit heard over the left femoral artery [2+] : left 2+ [No Abnormalities] : the abdominal aorta was not enlarged and no bruit was heard [No Pitting Edema] : no pitting edema present

## 2025-07-25 NOTE — DISCUSSION/SUMMARY
[EKG obtained to assist in diagnosis and management of assessed problem(s)] : EKG obtained to assist in diagnosis and management of assessed problem(s) [FreeTextEntry1] : Impression:  1. AF:  EKG performed today to assess for presence of conduction disease and reveals AF at 99bpm. Discussed treatment options for afib including rate control vs antiarrhythmics vs DCCV vs possible ablation. Given recurrent symptomatic afib despite rate control management and preference to avoid antiarrhythmics, recommend undergoing possible afib ablation. Patient is currently symptomatic with SOB recommend DCCV with an ablation in future. Patient will undergo cardioversion and will think about ablation.  He does not want any invasive procedures for now. Will continue bisoprolol 5 mg 1.5 tablets once daily. On warfarin for anticoagulation.   2. Chronic systolic CHF: NYHA Class II symptoms. EKG performed today to assess for presence of conduction disease and reveals sinus rhythm with frequent monomorphic PVCs, bigeminy pattern. Patient also had multiple episodes of NSVT by monitor.   msec. Given severe LV dysfunction, NYHA Class II symptoms and QRS > 150msec, recommended BiV ICD implant, considered subpectoral placement but may be safe to implant pre-pectoral, for primary prevention of SCD.   2. PVCs: frequent monomorphic PVCs and NSVT. Can consider ablation (after ICD pursued). In meantime, can trial mexiletine for PVC suppression. Would avoid amiodarone given HX of use and felt unwell as well as pulmonary hx.

## 2025-07-25 NOTE — CARDIOLOGY SUMMARY
[de-identified] : 7/25/2025: atrial fibrillation at 99bpm [de-identified] : 6/29/22 - EF 25% (best motion in basal lateral wall), LVIDD 7.2 cm, Bio MVR (mean 4), mild AR, mod diastolic dysfunction stage II, normal RV systolic function, no PFO, E/e' 24, LVOT VTI 28 cm, IVC 1.3 cm  4/23/21 - EF 10-15% (segmental; septal AK, anterolateral, apical and inferoseptal akinesis), LVEDD 7.1 cm, nl RV size/function, bioMVR , E/e' 21, IVC small  7/23/19 - LVEDD 5.9 cm, EF 25% (segmental; septal akinesis and apical akinesis); frequent PVCs, bioMVR   [de-identified] : 4/18/19 (pre-CABG) - distal LM 70%, ostial LAD 70%; ostial LCx 99%, distal LCx 80%; mRCA 50%

## 2025-07-25 NOTE — CARDIOLOGY SUMMARY
[de-identified] : 7/25/2025: atrial fibrillation at 99bpm [de-identified] : 6/29/22 - EF 25% (best motion in basal lateral wall), LVIDD 7.2 cm, Bio MVR (mean 4), mild AR, mod diastolic dysfunction stage II, normal RV systolic function, no PFO, E/e' 24, LVOT VTI 28 cm, IVC 1.3 cm  4/23/21 - EF 10-15% (segmental; septal AK, anterolateral, apical and inferoseptal akinesis), LVEDD 7.1 cm, nl RV size/function, bioMVR , E/e' 21, IVC small  7/23/19 - LVEDD 5.9 cm, EF 25% (segmental; septal akinesis and apical akinesis); frequent PVCs, bioMVR   [de-identified] : 4/18/19 (pre-CABG) - distal LM 70%, ostial LAD 70%; ostial LCx 99%, distal LCx 80%; mRCA 50%

## 2025-07-25 NOTE — HISTORY OF PRESENT ILLNESS
[FreeTextEntry1] : Felipe Cuellar is a 69y/o man with Hx of active tobacco use (0.5 ppd x 50 years, now down to 2 cig/day) c/b COPD/emphysema (occasional nocturnal oxygen 2LPM), CAD c/b MI (s/p PCI LAD 2010, LCx 2014) s/p CABG (LIMA-LAD, SVG-OM, SVG-Ramus, SVG-OM) and severe MR s/p bioMVR/MACEY appendage closure 4/19, HFrEF/ICM (EF 10-15%, LVEDD 6.8 cm; has refused ICD; DNR), prior ileostomy s/p reversal, aflutter s/p DCCV (on AC) and frequent PVCs who presents today for f/u evaluation. Has been following with HF team for years and has been referred for an ICD several times but has always chosen to avoid. Struggles with dyspnea, some days worse then others. Denies chest pain, syncope or near syncope. Saw HF team the other week and was open to rediscussing the possibility of an ICD. Also has frequent monomorphic PVCs which he may be experiencing worsening dyspnea on days when they are more present. His hast Holter 9/2024 with noted PVCs, 6% burden, although also had a 5% APC burden which may have actually been PVCs (overall 11% ectopy burden) and up to 11 sec of NSVT (38 runs in total). Does take extra bisoprolol at times which can make him feel a little better.  He reports in last month having increased SOB and has occasional chest discomfort. Denies, palpitations, syncope or near syncope.